# Patient Record
Sex: FEMALE | Race: WHITE | NOT HISPANIC OR LATINO | URBAN - METROPOLITAN AREA
[De-identification: names, ages, dates, MRNs, and addresses within clinical notes are randomized per-mention and may not be internally consistent; named-entity substitution may affect disease eponyms.]

---

## 2017-01-27 ENCOUNTER — APPOINTMENT (OUTPATIENT)
Age: 67
Setting detail: DERMATOLOGY
End: 2017-01-27

## 2017-01-27 DIAGNOSIS — Z71.89 OTHER SPECIFIED COUNSELING: ICD-10-CM

## 2017-01-27 DIAGNOSIS — L82.1 OTHER SEBORRHEIC KERATOSIS: ICD-10-CM

## 2017-01-27 DIAGNOSIS — Z85.828 PERSONAL HISTORY OF OTHER MALIGNANT NEOPLASM OF SKIN: ICD-10-CM

## 2017-01-27 DIAGNOSIS — D18.0 HEMANGIOMA: ICD-10-CM

## 2017-01-27 DIAGNOSIS — D22 MELANOCYTIC NEVI: ICD-10-CM

## 2017-01-27 DIAGNOSIS — L57.8 OTHER SKIN CHANGES DUE TO CHRONIC EXPOSURE TO NONIONIZING RADIATION: ICD-10-CM

## 2017-01-27 PROBLEM — D18.01 HEMANGIOMA OF SKIN AND SUBCUTANEOUS TISSUE: Status: ACTIVE | Noted: 2017-01-27

## 2017-01-27 PROBLEM — D22.62 MELANOCYTIC NEVI OF LEFT UPPER LIMB, INCLUDING SHOULDER: Status: ACTIVE | Noted: 2017-01-27

## 2017-01-27 PROBLEM — D22.39 MELANOCYTIC NEVI OF OTHER PARTS OF FACE: Status: ACTIVE | Noted: 2017-01-27

## 2017-01-27 PROBLEM — D22.5 MELANOCYTIC NEVI OF TRUNK: Status: ACTIVE | Noted: 2017-01-27

## 2017-01-27 PROBLEM — D22.61 MELANOCYTIC NEVI OF RIGHT UPPER LIMB, INCLUDING SHOULDER: Status: ACTIVE | Noted: 2017-01-27

## 2017-01-27 PROCEDURE — OTHER PRODUCT LINE (ANTI-AGING): OTHER

## 2017-01-27 PROCEDURE — 99203 OFFICE O/P NEW LOW 30 MIN: CPT

## 2017-01-27 PROCEDURE — OTHER COUNSELING: OTHER

## 2017-01-27 ASSESSMENT — LOCATION DETAILED DESCRIPTION DERM
LOCATION DETAILED: RIGHT DISTAL LATERAL POSTERIOR UPPER ARM
LOCATION DETAILED: INFERIOR THORACIC SPINE
LOCATION DETAILED: LEFT INFERIOR CENTRAL MALAR CHEEK
LOCATION DETAILED: LEFT DISTAL PRETIBIAL REGION
LOCATION DETAILED: RIGHT CENTRAL MALAR CHEEK
LOCATION DETAILED: RIGHT MEDIAL UPPER BACK
LOCATION DETAILED: LEFT PROXIMAL POSTERIOR UPPER ARM
LOCATION DETAILED: RIGHT INFERIOR MEDIAL MALAR CHEEK

## 2017-01-27 ASSESSMENT — LOCATION ZONE DERM
LOCATION ZONE: TRUNK
LOCATION ZONE: FACE
LOCATION ZONE: ARM
LOCATION ZONE: LEG

## 2017-01-27 ASSESSMENT — LOCATION SIMPLE DESCRIPTION DERM
LOCATION SIMPLE: LEFT POSTERIOR UPPER ARM
LOCATION SIMPLE: UPPER BACK
LOCATION SIMPLE: LEFT PRETIBIAL REGION
LOCATION SIMPLE: RIGHT UPPER BACK
LOCATION SIMPLE: LEFT CHEEK
LOCATION SIMPLE: RIGHT CHEEK
LOCATION SIMPLE: RIGHT POSTERIOR UPPER ARM

## 2017-01-27 NOTE — PROCEDURE: PRODUCT LINE (ANTI-AGING)
Product 3 Application Directions: Apply a pea sized amount to clean, dry face at bedtime.\\nLot# [id number]\\nExp. 10/3/2017 Product 3 Application Directions: Apply a pea sized amount to clean, dry face at bedtime.\\nLot# [id number]\\nExp. 10/3/2017

## 2017-01-27 NOTE — PROCEDURE: PRODUCT LINE (ANTI-AGING)
Product 9 Application Directions: Apply at bedtime\\nLot#[id number]\\nExp. 3/17/16 Product 9 Application Directions: Apply at bedtime\\nLot#[id number]\\nExp. 3/17/16

## 2017-01-27 NOTE — PROCEDURE: PRODUCT LINE (ANTI-AGING)
Product 5 Application Directions: Apply nightly to entire face as tolerated \\nLot# [id number] \\nExp-3/2016 Product 5 Application Directions: Apply nightly to entire face as tolerated \\nLot# [id number] \\nExp-3/2016

## 2017-01-27 NOTE — PROCEDURE: PRODUCT LINE (ANTI-AGING)
Product 2 Application Directions: Apply a pea sized amount to clean, dry face at bedtime qhs as tolerated

## 2017-01-27 NOTE — PROCEDURE: PRODUCT LINE (ANTI-AGING)
Product 8 Application Directions: Apply at bedtime\\nLot# [id number]\\nExp. 4/2016 Product 8 Application Directions: Apply at bedtime\\nLot# [id number]\\nExp. 4/2016

## 2017-01-27 NOTE — PROCEDURE: PRODUCT LINE (ANTI-AGING)
Product 4 Application Directions: Apply a pea sized amount to clean, dry face at bedtime.\\nLot# \\nExp.

## 2025-04-13 NOTE — PROCEDURE: PRODUCT LINE (ANTI-AGING)
Creatine stable    Product 1 Application Directions: Apply to brown spots once daily x 3-4 months on/3-4 months off\\nLot# [id number]\\nExp. 11/22/2017